# Patient Record
Sex: FEMALE | Race: OTHER | Employment: UNEMPLOYED | URBAN - METROPOLITAN AREA
[De-identification: names, ages, dates, MRNs, and addresses within clinical notes are randomized per-mention and may not be internally consistent; named-entity substitution may affect disease eponyms.]

---

## 2018-10-25 ENCOUNTER — TELEPHONE (OUTPATIENT)
Dept: ONCOLOGY | Age: 33
End: 2018-10-25

## 2018-12-05 ENCOUNTER — INITIAL CONSULT (OUTPATIENT)
Dept: ONCOLOGY | Age: 33
End: 2018-12-05
Payer: MEDICARE

## 2018-12-05 VITALS
SYSTOLIC BLOOD PRESSURE: 120 MMHG | WEIGHT: 220.38 LBS | DIASTOLIC BLOOD PRESSURE: 78 MMHG | BODY MASS INDEX: 35.42 KG/M2 | TEMPERATURE: 98.1 F | HEART RATE: 90 BPM | HEIGHT: 66 IN

## 2018-12-05 DIAGNOSIS — K90.49 MALABSORPTION DUE TO INTOLERANCE, NOT ELSEWHERE CLASSIFIED: ICD-10-CM

## 2018-12-05 DIAGNOSIS — D64.9 ANEMIA, UNSPECIFIED TYPE: Primary | ICD-10-CM

## 2018-12-05 DIAGNOSIS — R53.82 CHRONIC FATIGUE: ICD-10-CM

## 2018-12-05 DIAGNOSIS — E61.1 IRON DEFICIENCY: ICD-10-CM

## 2018-12-05 PROCEDURE — 99201 HC NEW PT, E/M LEVEL 1: CPT | Performed by: INTERNAL MEDICINE

## 2018-12-05 PROCEDURE — 99204 OFFICE O/P NEW MOD 45 MIN: CPT | Performed by: INTERNAL MEDICINE

## 2018-12-05 RX ORDER — RANITIDINE 150 MG/1
150 TABLET ORAL 2 TIMES DAILY
COMMUNITY
Start: 2018-11-24

## 2018-12-05 RX ORDER — NAPROXEN 500 MG/1
500 TABLET ORAL 2 TIMES DAILY WITH MEALS
COMMUNITY

## 2018-12-05 RX ORDER — CELECOXIB 100 MG/1
100 CAPSULE ORAL 2 TIMES DAILY
COMMUNITY

## 2018-12-05 RX ORDER — BACLOFEN 10 MG/1
10 TABLET ORAL 2 TIMES DAILY
COMMUNITY

## 2018-12-05 RX ORDER — LEVOTHYROXINE SODIUM 88 UG/1
88 TABLET ORAL DAILY
COMMUNITY
Start: 2018-11-24

## 2018-12-05 RX ORDER — FLUTICASONE PROPIONATE 50 MCG
SPRAY, SUSPENSION (ML) NASAL DAILY PRN
COMMUNITY
Start: 2018-11-24

## 2018-12-05 ASSESSMENT — ENCOUNTER SYMPTOMS
NAUSEA: 0
BLOOD IN STOOL: 0
DIARRHEA: 0
COUGH: 0
VOMITING: 0
SHORTNESS OF BREATH: 0
CONSTIPATION: 0
WHEEZING: 0
ABDOMINAL PAIN: 0
SORE THROAT: 0

## 2018-12-05 NOTE — PROGRESS NOTES
CONSULT NOTE    PCP: No primary care provider on file. Referring Provider: Shawnee Mckeon DO    VISIT DIAGNOSIS:  The primary encounter diagnosis was Anemia, unspecified type. Diagnoses of Iron deficiency, Chronic fatigue, and Malabsorption due to intolerance, not elsewhere classified were also pertinent to this visit. SUBJECTIVE/HPI:  Pastora Black is a very pleasant 26 y.o. femalewho is presenting for consultation at the request of her PCP. She was recently identified as having iron deficiency anemia that is refractory to oral therapy. She presents today having ongoing fatigue and lethargy. This is causing her to have some failure to thrive. She's indicating that the G is impacting her quality of life and activities of daily living. She has an IUD. She's not actively menstruating. She's not reporting any observable blood loss including bloody stool. The fatigue is somewhat lifestyle limiting but she is able to function at a fairly high level. She's not reporting any chest pain, shortness of breath, abdominal discomfort, nausea/vomiting, anorexia/unintentional weight loss or other worrisome symptoms to be suggestive of occult malignancy. She is been taking oral iron supplementation off-and-on for years. Unfortunately, it's not had much been impact on her iron levels. She is wondering if there are any other options. Otherwise, she has no other issues or concerns to bring to my attention today. She is interested in hearing my thoughts regarding further workup and management.     PAST MEDICAL HISTORY:      Diagnosis Date    Acid reflux     Anxiety     Depression     Hypothyroidism        PAST SURGICAL HISTORY:      Procedure Laterality Date    WISDOM TOOTH EXTRACTION         CURRENT MEDICATIONS:   Current Outpatient Prescriptions   Medication Sig Dispense Refill    celecoxib (CELEBREX) 100 MG capsule Take 100 mg by mouth 2 times daily      naproxen (NAPROSYN) 500 MG tablet Take 500 mg

## 2018-12-07 ENCOUNTER — HOSPITAL ENCOUNTER (OUTPATIENT)
Age: 33
Discharge: HOME | End: 2018-12-07
Payer: MEDICARE

## 2018-12-07 ENCOUNTER — TELEPHONE (OUTPATIENT)
Dept: ONCOLOGY | Age: 33
End: 2018-12-07

## 2018-12-07 DIAGNOSIS — R53.82 CHRONIC FATIGUE: ICD-10-CM

## 2018-12-07 DIAGNOSIS — K90.49 MALABSORPTION DUE TO INTOLERANCE, NOT ELSEWHERE CLASSIFIED: ICD-10-CM

## 2018-12-07 DIAGNOSIS — D64.9 ANEMIA, UNSPECIFIED TYPE: ICD-10-CM

## 2018-12-07 DIAGNOSIS — E61.1 IRON DEFICIENCY: ICD-10-CM

## 2018-12-07 LAB
DATE, STOOL #1: NORMAL
DATE, STOOL #2: NORMAL
DATE, STOOL #3: NORMAL
HEMOCCULT SP1 STL QL: NEGATIVE
HEMOCCULT SP2 STL QL: NEGATIVE
HEMOCCULT SP3 STL QL: NEGATIVE
TIME, STOOL #1: NORMAL
TIME, STOOL #2: NORMAL
TIME, STOOL #3: NORMAL

## 2018-12-07 PROCEDURE — 82272 OCCULT BLD FECES 1-3 TESTS: CPT

## 2018-12-12 ENCOUNTER — TELEPHONE (OUTPATIENT)
Dept: ONCOLOGY | Age: 33
End: 2018-12-12